# Patient Record
Sex: FEMALE | Race: WHITE | Employment: OTHER | URBAN - METROPOLITAN AREA
[De-identification: names, ages, dates, MRNs, and addresses within clinical notes are randomized per-mention and may not be internally consistent; named-entity substitution may affect disease eponyms.]

---

## 2018-09-19 ENCOUNTER — OFFICE VISIT (OUTPATIENT)
Dept: FAMILY MEDICINE CLINIC | Age: 43
End: 2018-09-19

## 2018-09-19 VITALS
SYSTOLIC BLOOD PRESSURE: 100 MMHG | TEMPERATURE: 97.7 F | HEART RATE: 88 BPM | BODY MASS INDEX: 24.82 KG/M2 | DIASTOLIC BLOOD PRESSURE: 70 MMHG | WEIGHT: 148 LBS | OXYGEN SATURATION: 100 % | RESPIRATION RATE: 16 BRPM

## 2018-09-19 DIAGNOSIS — Z23 NEED FOR DIPHTHERIA-TETANUS-PERTUSSIS (TDAP) VACCINE: ICD-10-CM

## 2018-09-19 DIAGNOSIS — Z12.39 BREAST CANCER SCREENING: ICD-10-CM

## 2018-09-19 DIAGNOSIS — Z00.00 ROUTINE GENERAL MEDICAL EXAMINATION AT A HEALTH CARE FACILITY: Primary | ICD-10-CM

## 2018-09-19 DIAGNOSIS — Z23 NEEDS FLU SHOT: ICD-10-CM

## 2018-09-19 PROCEDURE — 90471 IMMUNIZATION ADMIN: CPT | Performed by: FAMILY MEDICINE

## 2018-09-19 PROCEDURE — 99396 PREV VISIT EST AGE 40-64: CPT | Performed by: FAMILY MEDICINE

## 2018-09-19 PROCEDURE — 90472 IMMUNIZATION ADMIN EACH ADD: CPT | Performed by: FAMILY MEDICINE

## 2018-09-19 PROCEDURE — 90686 IIV4 VACC NO PRSV 0.5 ML IM: CPT | Performed by: FAMILY MEDICINE

## 2018-09-19 PROCEDURE — 90715 TDAP VACCINE 7 YRS/> IM: CPT | Performed by: FAMILY MEDICINE

## 2018-09-19 ASSESSMENT — PATIENT HEALTH QUESTIONNAIRE - PHQ9
1. LITTLE INTEREST OR PLEASURE IN DOING THINGS: 0
SUM OF ALL RESPONSES TO PHQ QUESTIONS 1-9: 0
SUM OF ALL RESPONSES TO PHQ9 QUESTIONS 1 & 2: 0
2. FEELING DOWN, DEPRESSED OR HOPELESS: 0
SUM OF ALL RESPONSES TO PHQ QUESTIONS 1-9: 0

## 2018-09-19 NOTE — PROGRESS NOTES
Current Influenza vaccine VIS given to patient. Influenza consent form/questionnaire completed and signed. Patient responses to  Influenza consent form / questionnaire  reviewed. Vaccine given per protocol. Vaccine Information Sheet, \"Influenza - Inactivated\"  given to Neville Webb, or parent/legal guardian of  Neville Webb and verbalized understanding. Patient responses:    Have you ever had a reaction to a flu vaccine? No  Are you able to eat eggs without adverse effects? Yes  Do you have any current illness? No  Have you ever had Guillian [place] Syndrome? No    Flu vaccine given per order. Please see immunization tab.

## 2018-09-24 ENCOUNTER — HOSPITAL ENCOUNTER (OUTPATIENT)
Dept: MAMMOGRAPHY | Age: 43
Discharge: HOME | End: 2018-09-24
Payer: COMMERCIAL

## 2018-09-24 DIAGNOSIS — Z12.31 VISIT FOR SCREENING MAMMOGRAM: ICD-10-CM

## 2018-09-24 DIAGNOSIS — R92.8 ABNORMAL MAMMOGRAM OF RIGHT BREAST: Primary | ICD-10-CM

## 2018-09-24 DIAGNOSIS — Z00.00 ROUTINE GENERAL MEDICAL EXAMINATION AT A HEALTH CARE FACILITY: ICD-10-CM

## 2018-09-24 LAB
A/G RATIO: 1.7 (ref 1.1–2.2)
ALBUMIN SERPL-MCNC: 4.2 G/DL (ref 3.4–5)
ALP BLD-CCNC: 54 U/L (ref 40–129)
ALT SERPL-CCNC: 12 U/L (ref 10–40)
ANION GAP SERPL CALCULATED.3IONS-SCNC: 11 MMOL/L (ref 3–16)
AST SERPL-CCNC: 18 U/L (ref 15–37)
BILIRUB SERPL-MCNC: 0.5 MG/DL (ref 0–1)
BUN BLDV-MCNC: 8 MG/DL (ref 7–20)
CALCIUM SERPL-MCNC: 8.8 MG/DL (ref 8.3–10.6)
CHLORIDE BLD-SCNC: 103 MMOL/L (ref 99–110)
CHOLESTEROL, TOTAL: 132 MG/DL (ref 0–199)
CO2: 27 MMOL/L (ref 21–32)
CREAT SERPL-MCNC: 0.6 MG/DL (ref 0.6–1.1)
GFR AFRICAN AMERICAN: >60
GFR NON-AFRICAN AMERICAN: >60
GLOBULIN: 2.5 G/DL
GLUCOSE BLD-MCNC: 86 MG/DL (ref 70–99)
HDLC SERPL-MCNC: 59 MG/DL (ref 40–60)
LDL CHOLESTEROL CALCULATED: 59 MG/DL
POTASSIUM SERPL-SCNC: 4.3 MMOL/L (ref 3.5–5.1)
SODIUM BLD-SCNC: 141 MMOL/L (ref 136–145)
TOTAL PROTEIN: 6.7 G/DL (ref 6.4–8.2)
TRIGL SERPL-MCNC: 70 MG/DL (ref 0–150)
VLDLC SERPL CALC-MCNC: 14 MG/DL

## 2018-09-24 PROCEDURE — 77067 SCR MAMMO BI INCL CAD: CPT

## 2018-09-25 ENCOUNTER — HOSPITAL ENCOUNTER (OUTPATIENT)
Dept: MAMMOGRAPHY | Age: 43
Discharge: HOME | End: 2018-09-25
Payer: COMMERCIAL

## 2018-09-25 ENCOUNTER — HOSPITAL ENCOUNTER (OUTPATIENT)
Dept: ULTRASOUND IMAGING | Age: 43
Discharge: HOME | End: 2018-09-25
Payer: COMMERCIAL

## 2018-09-25 DIAGNOSIS — R92.8 ABNORMAL MAMMOGRAM OF RIGHT BREAST: Primary | ICD-10-CM

## 2018-09-25 DIAGNOSIS — R92.8 ABNORMAL MAMMOGRAM: ICD-10-CM

## 2018-09-25 DIAGNOSIS — R92.8 ABNORMAL MAMMOGRAM OF RIGHT BREAST: ICD-10-CM

## 2018-09-25 PROCEDURE — 76642 ULTRASOUND BREAST LIMITED: CPT

## 2018-09-25 PROCEDURE — 77065 DX MAMMO INCL CAD UNI: CPT

## 2018-12-14 ENCOUNTER — OFFICE VISIT (OUTPATIENT)
Dept: FAMILY MEDICINE CLINIC | Age: 43
End: 2018-12-14
Payer: COMMERCIAL

## 2018-12-14 VITALS
SYSTOLIC BLOOD PRESSURE: 109 MMHG | BODY MASS INDEX: 23.95 KG/M2 | HEART RATE: 70 BPM | DIASTOLIC BLOOD PRESSURE: 70 MMHG | WEIGHT: 149 LBS | OXYGEN SATURATION: 100 % | HEIGHT: 66 IN | RESPIRATION RATE: 12 BRPM | TEMPERATURE: 98.6 F

## 2018-12-14 DIAGNOSIS — R35.0 URINARY FREQUENCY: Primary | ICD-10-CM

## 2018-12-14 DIAGNOSIS — R30.0 DYSURIA: ICD-10-CM

## 2018-12-14 LAB
BILIRUBIN, POC: NEGATIVE
BLOOD URINE, POC: ABNORMAL
CLARITY, POC: ABNORMAL
COLOR, POC: CLEAR
GLUCOSE URINE, POC: NEGATIVE
KETONES, POC: NEGATIVE
LEUKOCYTE EST, POC: ABNORMAL
NITRITE, POC: NEGATIVE
PH, POC: 6
PROTEIN, POC: NEGATIVE
SPECIFIC GRAVITY, POC: 1
UROBILINOGEN, POC: 0.2

## 2018-12-14 PROCEDURE — 81002 URINALYSIS NONAUTO W/O SCOPE: CPT | Performed by: FAMILY MEDICINE

## 2018-12-14 PROCEDURE — 99214 OFFICE O/P EST MOD 30 MIN: CPT | Performed by: FAMILY MEDICINE

## 2018-12-14 RX ORDER — NITROFURANTOIN 25; 75 MG/1; MG/1
100 CAPSULE ORAL 2 TIMES DAILY
Qty: 14 CAPSULE | Refills: 0 | Status: SHIPPED | OUTPATIENT
Start: 2018-12-14 | End: 2018-12-21

## 2018-12-14 NOTE — PROGRESS NOTES
Patient is here for urinary frequency and dysuria. Stronger urine odor. She denies abnormal vaginal bleeding, itching, odor, discharge or unusual pelvic pain, or hematuria. No fever. No abdominal or back pain. Trying to increase fluids. No regular caffeine. LMP was 12/1/18 . She uses a menstrual cup and notices some urinary frequency. Last sexually active 1 week ago or so. Last UTI was > 1 year ago. Review of Systems    ROS: All other systems were reviewed and are negative . Patient's allergies and medications were reviewed. Patient's past medical, surgical, social , and family history were reviewed. Results for POC orders placed in visit on 12/14/18   POCT Urinalysis no Micro   Result Value Ref Range    Color, UA      Clarity, UA      Glucose, UA POC      Bilirubin, UA      Ketones, UA      Spec Grav, UA      Blood, UA POC      pH, UA      Protein, UA POC      Urobilinogen, UA      Leukocytes, UA      Nitrite, UA          OBJECTIVE:  /70   Pulse 70   Temp 98.6 °F (37 °C) (Oral)   Resp 12   Ht 5' 6\" (1.676 m)   Wt 149 lb (67.6 kg)   LMP 12/01/2018 (Exact Date)   SpO2 100%   Breastfeeding? No   BMI 24.05 kg/m²     Physical Exam    General: NAD, cooperative, alert and oriented X 3. Mood / affect is good. good insight. well hydrated. Neck : no lymphadenopathy, supple, FROM  CV: Regular rate and rhythm , no murmurs/ rub/ gallop. No edema. Lungs : CTA bilaterally, breathing comfortably  Abdomen: positive bowel sounds, soft , non tender, non distended. No hepatosplenomegaly. No CVA tenderness. Skin: no rashes. Non tender. ASSESSMENT/  PLAN:  1. Urinary frequency/ 2. Dysuria  - push fluids- water. Avoid caffeine/ alcohol.   - POCT Urinalysis no Micro  - Urine Culture  - treat empirically with Macrobid bid X 7 days. - nitrofurantoin, macrocrystal-monohydrate, (MACROBID) 100 MG capsule; Take 1 capsule by mouth 2 times daily for 7 days  Dispense: 14 capsule;  Refill: 0    F/u if no

## 2018-12-17 ENCOUNTER — TELEPHONE (OUTPATIENT)
Dept: FAMILY MEDICINE CLINIC | Age: 43
End: 2018-12-17

## 2018-12-17 LAB
ORGANISM: ABNORMAL
URINE CULTURE, ROUTINE: ABNORMAL
URINE CULTURE, ROUTINE: ABNORMAL

## 2018-12-17 RX ORDER — CIPROFLOXACIN 500 MG/1
500 TABLET, FILM COATED ORAL 2 TIMES DAILY
Qty: 10 TABLET | Refills: 0 | Status: SHIPPED | OUTPATIENT
Start: 2018-12-17 | End: 2018-12-22

## 2020-10-22 ENCOUNTER — OFFICE VISIT (OUTPATIENT)
Dept: FAMILY MEDICINE CLINIC | Age: 45
End: 2020-10-22
Payer: COMMERCIAL

## 2020-10-22 VITALS
TEMPERATURE: 97.2 F | HEART RATE: 68 BPM | SYSTOLIC BLOOD PRESSURE: 120 MMHG | RESPIRATION RATE: 16 BRPM | WEIGHT: 148.8 LBS | HEIGHT: 65 IN | DIASTOLIC BLOOD PRESSURE: 80 MMHG | BODY MASS INDEX: 24.79 KG/M2 | OXYGEN SATURATION: 97 %

## 2020-10-22 PROCEDURE — 90686 IIV4 VACC NO PRSV 0.5 ML IM: CPT | Performed by: FAMILY MEDICINE

## 2020-10-22 PROCEDURE — 90471 IMMUNIZATION ADMIN: CPT | Performed by: FAMILY MEDICINE

## 2020-10-22 PROCEDURE — 99396 PREV VISIT EST AGE 40-64: CPT | Performed by: FAMILY MEDICINE

## 2020-10-22 ASSESSMENT — PATIENT HEALTH QUESTIONNAIRE - PHQ9
SUM OF ALL RESPONSES TO PHQ QUESTIONS 1-9: 0
SUM OF ALL RESPONSES TO PHQ QUESTIONS 1-9: 0
2. FEELING DOWN, DEPRESSED OR HOPELESS: 0
1. LITTLE INTEREST OR PLEASURE IN DOING THINGS: 0
SUM OF ALL RESPONSES TO PHQ QUESTIONS 1-9: 0
SUM OF ALL RESPONSES TO PHQ9 QUESTIONS 1 & 2: 0

## 2020-10-22 NOTE — PROGRESS NOTES
SUBJECTIVE:   38 y.o. female for annual routine Pap and checkup. Current Outpatient Medications   Medication Sig Dispense Refill    ibuprofen (ADVIL;MOTRIN) 200 MG tablet Take 200 mg by mouth every 6 hours as needed for Pain      cephALEXin (KEFLEX) 500 MG capsule Take 1 capsule by mouth 3 times daily (Patient not taking: Reported on 10/22/2020) 30 capsule 0     No current facility-administered medications for this visit. Allergies: Patient has no known allergies. Patient's last menstrual period was 10/09/2020 (exact date). Last PAP: 9/15- nl/HPV neg; 2014 ? > 1 year ago. History of Abnormal PAP: never  Prior mammogram: 9/18- repeat right in 3/19  Sexually active: yes  Self breast exam: yes , but sporadic   LMP: 10/9/2020 . Menses q 28 d; last 5days; NBB . Smoker: no   Alcohol: 2/ week   Caffeine: no   Exercise: walking - 3d/ week     Check skin lesion to right temple X years. Questionable size change. Some occasional dysphagia- liquids or solid foods . No sore throat . No nasal congestion or post nasal drip . Occurs 1x/ week or so. X 1 month. No heartburn / indigestion. Some occasional hoarseness. No belching. No cough. ROS:  Feeling well. No dyspnea or chest pain on exertion. No abdominal pain, change in bowel habits, black or bloody stools. No urinary tract symptoms. GYN ROS: normal menses, no abnormal bleeding, pelvic pain or discharge, no breast pain or new or enlarging lumps on self exam. No neurological complaints. See patient physical/  ROS questionnaire. Patient's allergies and medications were reviewed. Patient's past medical, surgical, social , and family history were reviewed. OBJECTIVE:   The patient appears well, alert, oriented x 3, in no distress, cooperative.   /80   Pulse 68   Temp 97.2 °F (36.2 °C) (Oral)   Resp 16   Ht 5' 5\" (1.651 m)   Wt 148 lb 12.8 oz (67.5 kg)   LMP 10/09/2020 (Exact Date)   SpO2 97%   BMI 24.76 kg/m²    There were no vitals filed for this visit. HEENT: normocephalic, atraumatic, PERRLA, EOMI, tympanic membranes and nasopharynx are normal.  Neck : supple. No adenopathy or thyromegaly. FROM. Upper extremities : DTRs 2+ biceps/ triceps/ brachioradialis bilateral.  FROM. Strength 5/5. Lungs are clear, good air entry, no wheezes, rhonchi or rales. Breathing comfortably. Cardiovascular: Regular rate  and rhythm. S1 and S2 are normal, no murmurs,rubs, and gallops. No edema. Abdomen is soft without tenderness, guarding, mass or organomegaly. Normal bowel sounds. Non distended. Breasts are symmetric. No dominant, discrete, fixed  or suspicious masses are noted. No skin or nipple changes or axillary nodes. : Vagina and vulva are normal;  no discharge is noted. Cervix normal without lesions. Uterus anteverted and mobile, normal in size and shape without tenderness. Adnexa normal in size without masses or tenderness. Pap Smear - is completed today. Exam chaperoned by female assistant. Back: Cervical, thoracic and lumbar spine exam is normal without tenderness, masses or kyphoscoliosis. Full range of motion without pain is noted. Lower extremities : DTRs 2+ knees and ankles bilateral.  FROM. Strength 5/5. Negative straight leg-raise. No edema or erythema bilateral.  normal peripheral pulses. Neuro: Cranial nerves 2-12 are normal. Deep tendon reflexes are 2+ and equal to all extremities. No focal sensory, or motor deficit noted. Skin: no rashes. 5 mm lesion , hyperpigmented to right temple, raised. Well circumscribed. ASSESSMENT:   1. Routine general medical examination at a health care facility  - Comprehensive Metabolic Panel; Future  - Lipid Panel; Future    2. Gynecologic exam normal  - PAP SMEAR  - Human papillomavirus (HPV) DNA probe thin prep high risk    3. Dysphagia, unspecified type  - recommended dietary/ lifestyle modifications and Prilosec 20 mg qd.    - If no improvement over next 2-4 weeks, refer to Aarti Goss MD, Otolaryngology, [facility]    4. Skin lesion- right temple  - monitor . If increased size/ changes, advised follow up . 5. Needs flu shot  - INFLUENZA, QUADV, 0.5ML, 6 MO AND OLDER, IM, PF, PREFILL SYR OR SDV (FLUZONE QUADV, PF)    PLAN:  Follow a low fat, low cholesterol diet,  continue current healthy lifestyle patterns, including regular cardiovascular exercise >150 minutes per week,  and return for routine annual checkup  Yearly mammogram recommended , as well as monthly self breast exam.   Calcium 1200 mg/ day , Vitamin D 400 IU/ day, and weight bearing exercise. Follow up 3 months/ prn.

## 2020-10-22 NOTE — PROGRESS NOTES
Vaccine Information Sheet, \"Influenza - Inactivated\"  given to Jossy Oneal, or parent/legal guardian of  Jossy Oneal and verbalized understanding. Patient responses:    Have you ever had a reaction to a flu vaccine? No  Do you have any current illness? No  Have you ever had Guillian [place] Syndrome? No  Do you have a serious allergy to any of the follow: Neomycin, Polymyxin, Thimerosal, eggs or egg products? No    Flu vaccine given per order. Please see immunization tab. Risks and benefits explained. Current VIS given. Immunizations Administered     Name Date Dose Route    Influenza, Quadv, IM, PF (6 mo and older Fluzone, Flulaval, Fluarix, and 3 yrs and older Afluria) 10/22/2020 0.5 mL Intramuscular    Site: Deltoid- Left    Lot: [id number]    NDC: 89564-898-66         Current Influenza vaccine VIS given to patient. Influenza consent form/questionnaire completed and signed. Patient responses to  Influenza consent form / questionnaire  reviewed. Vaccine given per protocol.

## 2020-10-23 LAB
HPV COMMENT: NORMAL
HPV TYPE 16: NOT DETECTED
HPV TYPE 18: NOT DETECTED
HPVOH (OTHER TYPES): NOT DETECTED

## 2020-10-26 DIAGNOSIS — Z00.00 ROUTINE GENERAL MEDICAL EXAMINATION AT A HEALTH CARE FACILITY: ICD-10-CM

## 2020-10-26 LAB
A/G RATIO: 1.6 (ref 1.1–2.2)
ALBUMIN SERPL-MCNC: 4.1 G/DL (ref 3.4–5)
ALP BLD-CCNC: 46 U/L (ref 40–129)
ALT SERPL-CCNC: 9 U/L (ref 10–40)
ANION GAP SERPL CALCULATED.3IONS-SCNC: 11 MMOL/L (ref 3–16)
AST SERPL-CCNC: 15 U/L (ref 15–37)
BILIRUB SERPL-MCNC: 0.4 MG/DL (ref 0–1)
BUN BLDV-MCNC: 11 MG/DL (ref 7–20)
CALCIUM SERPL-MCNC: 8.6 MG/DL (ref 8.3–10.6)
CHLORIDE BLD-SCNC: 102 MMOL/L (ref 99–110)
CHOLESTEROL, TOTAL: 143 MG/DL (ref 0–199)
CO2: 24 MMOL/L (ref 21–32)
CREAT SERPL-MCNC: 0.6 MG/DL (ref 0.6–1.1)
GFR AFRICAN AMERICAN: >60
GFR NON-AFRICAN AMERICAN: >60
GLOBULIN: 2.5 G/DL
GLUCOSE BLD-MCNC: 78 MG/DL (ref 70–99)
HDLC SERPL-MCNC: 61 MG/DL (ref 40–60)
LDL CHOLESTEROL CALCULATED: 73 MG/DL
POTASSIUM SERPL-SCNC: 4.3 MMOL/L (ref 3.5–5.1)
SODIUM BLD-SCNC: 137 MMOL/L (ref 136–145)
TOTAL PROTEIN: 6.6 G/DL (ref 6.4–8.2)
TRIGL SERPL-MCNC: 46 MG/DL (ref 0–150)
VLDLC SERPL CALC-MCNC: 9 MG/DL

## 2020-10-28 ENCOUNTER — TELEPHONE (OUTPATIENT)
Dept: FAMILY MEDICINE CLINIC | Age: 45
End: 2020-10-28

## 2020-12-28 ENCOUNTER — OFFICE VISIT (OUTPATIENT)
Dept: PRIMARY CARE CLINIC | Age: 45
End: 2020-12-28
Payer: COMMERCIAL

## 2020-12-28 LAB — SARS-COV-2, NAA: DETECTED

## 2020-12-28 PROCEDURE — 99211 OFF/OP EST MAY X REQ PHY/QHP: CPT | Performed by: NURSE PRACTITIONER

## 2020-12-28 NOTE — PROGRESS NOTES
Carolyn Grover received a viral test for COVID-19. They were educated on isolation and quarantine as appropriate. For any symptoms, they were directed to seek care from their PCP, given contact information to establish with a doctor, directed to an urgent care or the emergency room.

## 2020-12-29 NOTE — RESULT ENCOUNTER NOTE
Left message to contact PCP. Your test came back detected/positive for Covid-19. What happens if I have a positive test?  If you have symptoms:  Isolate until all three of these things are true: 1) your symptoms are better, 2) it has been 10 days since you first felt sick, and 3) you have had no fever for at least 24 hours without using medicine that lowers fever. Drink plenty of fluids and eat when you can. You may take medicine for pain or fever if you need to. Rest as much as you can. If you do not have symptoms:  Stay home for 10 days after the date you were tested. If you develop symptoms during those 10 days, stay home until all three of these things are true: 1) your symptoms are better, 2) it has been 10 days since you first felt sick, and 3) you have had no fever for at least 24 hours without using medicine that lowers fever. Follow care instructions from your doctor or other healthcare provider. Seek emergency medical care immediately if you have trouble breathing, persistent pain or pressure in the chest, new confusion, inability to wake or stay awake, or bluish lips or face. Someone from the health department (case investigator or contact tracer) may reach out to you to check on your health and ask about other people you have been around or where you've spent time while you may have been able to spread COVID-19 to others. This person's role is strictly to map the virus to help identify people who may have been exposed to the virus and prevent its spread. The local health department will also provide guidance on how to stay safely at home to avoid spreading illness. Detected results can happen for months and you are not considered contagious. No retest is necessary.

## 2021-01-19 ENCOUNTER — HOSPITAL ENCOUNTER (OUTPATIENT)
Dept: MAMMOGRAPHY | Age: 46
Discharge: HOME | End: 2021-01-19
Payer: COMMERCIAL

## 2021-01-19 DIAGNOSIS — Z12.31 VISIT FOR SCREENING MAMMOGRAM: ICD-10-CM

## 2021-01-19 PROCEDURE — 77067 SCR MAMMO BI INCL CAD: CPT

## 2021-03-08 ENCOUNTER — OFFICE VISIT (OUTPATIENT)
Dept: FAMILY MEDICINE CLINIC | Age: 46
End: 2021-03-08
Payer: COMMERCIAL

## 2021-03-08 VITALS
SYSTOLIC BLOOD PRESSURE: 118 MMHG | OXYGEN SATURATION: 100 % | TEMPERATURE: 97.8 F | WEIGHT: 152 LBS | RESPIRATION RATE: 16 BRPM | HEART RATE: 92 BPM | BODY MASS INDEX: 25.29 KG/M2 | DIASTOLIC BLOOD PRESSURE: 70 MMHG

## 2021-03-08 DIAGNOSIS — L29.9 ITCHING: Primary | ICD-10-CM

## 2021-03-08 DIAGNOSIS — R21 RASH: ICD-10-CM

## 2021-03-08 PROCEDURE — 99213 OFFICE O/P EST LOW 20 MIN: CPT | Performed by: FAMILY MEDICINE

## 2021-03-08 RX ORDER — HYDROXYZINE HYDROCHLORIDE 25 MG/1
25 TABLET, FILM COATED ORAL 4 TIMES DAILY PRN
Qty: 90 TABLET | Refills: 0 | Status: SHIPPED | OUTPATIENT
Start: 2021-03-08 | End: 2021-03-18

## 2021-03-08 ASSESSMENT — PATIENT HEALTH QUESTIONNAIRE - PHQ9
SUM OF ALL RESPONSES TO PHQ QUESTIONS 1-9: 0
SUM OF ALL RESPONSES TO PHQ9 QUESTIONS 1 & 2: 0
2. FEELING DOWN, DEPRESSED OR HOPELESS: 0
1. LITTLE INTEREST OR PLEASURE IN DOING THINGS: 0

## 2021-03-08 NOTE — PROGRESS NOTES
Patient is here for rash X 5-6 days . Itching. Problems sleeping due to itching. Started on back with pimple like rash. Now to abdomen and chest.  Some rash to upper arms and wrist.  Very few to legs behind knees - itching. No new medication or products. No family members with rash. She stayed withi in laws on President's Day weekend. No fever. Itching is worse at night. Had FiFully in 12/2020. No lip or tongue swelling. No shortness of breath . Has not taken anything over the counter to date. No rash to groin, axilla, hands or feet. Review of Systems    ROS: All other systems were reviewed and are negative . Patient's allergies and medications were reviewed. Patient's past medical, surgical, social , and family history were reviewed. OBJECTIVE:  /70   Pulse 92   Temp 97.8 °F (36.6 °C) (Temporal)   Resp 16   Wt 152 lb (68.9 kg)   LMP 03/08/2021 (Exact Date)   SpO2 100%   BMI 25.29 kg/m²     Physical Exam    General: NAD, cooperative, alert and oriented X 3. Mood / affect is good. good insight. well hydrated. Neck : no lymphadenopathy, supple, FROM  CV: Regular rate and rhythm , no murmurs/ rub/ gallop. No edema. Lungs : CTA bilaterally, breathing comfortably  Abdomen: positive bowel sounds, soft , non tender, non distended. No hepatosplenomegaly. No CVA tenderness. Skin: no rashes. Non tender. ASSESSMENT/  PLAN:  1. Itching/ 2. Rash  - questionable etiology, but allergic component suspected. - Trial of HydrOXYzine (ATARAX) 25 MG tablet; Take 1 tablet by mouth 4 times daily as needed for Itching  Dispense: 90 tablet; Refill: 0  - Discussed oral steroids, but will hold, but consider if worsening.   - Doubt scabies given distribution of rash. - Hold on skin biopsy. F/u if no improvement 7d/ prn increased symptoms.